# Patient Record
Sex: MALE | Race: WHITE
[De-identification: names, ages, dates, MRNs, and addresses within clinical notes are randomized per-mention and may not be internally consistent; named-entity substitution may affect disease eponyms.]

---

## 2023-07-09 ENCOUNTER — HOSPITAL ENCOUNTER (EMERGENCY)
Dept: HOSPITAL 97 - ER | Age: 38
Discharge: HOME | End: 2023-07-09
Payer: COMMERCIAL

## 2023-07-09 VITALS — OXYGEN SATURATION: 99 % | SYSTOLIC BLOOD PRESSURE: 112 MMHG | DIASTOLIC BLOOD PRESSURE: 78 MMHG

## 2023-07-09 VITALS — TEMPERATURE: 98.4 F

## 2023-07-09 DIAGNOSIS — J32.9: ICD-10-CM

## 2023-07-09 DIAGNOSIS — R05.9: ICD-10-CM

## 2023-07-09 DIAGNOSIS — F17.210: ICD-10-CM

## 2023-07-09 DIAGNOSIS — R19.7: ICD-10-CM

## 2023-07-09 DIAGNOSIS — H66.001: Primary | ICD-10-CM

## 2023-07-09 LAB
ALBUMIN SERPL BCP-MCNC: 3.9 G/DL (ref 3.4–5)
ALP SERPL-CCNC: 83 U/L (ref 45–117)
ALT SERPL W P-5'-P-CCNC: 66 U/L (ref 16–61)
AST SERPL W P-5'-P-CCNC: 38 U/L (ref 15–37)
BUN BLD-MCNC: 20 MG/DL (ref 7–18)
GLUCOSE SERPLBLD-MCNC: 108 MG/DL (ref 74–106)
HCT VFR BLD CALC: 47.1 % (ref 39.6–49)
LIPASE SERPL-CCNC: 32 U/L (ref 13–75)
LYMPHOCYTES # SPEC AUTO: 2.4 K/UL (ref 0.7–4.9)
MCV RBC: 92.5 FL (ref 80–100)
PMV BLD: 6.9 FL (ref 7.6–11.3)
POTASSIUM SERPL-SCNC: 3.4 MEQ/L (ref 3.5–5.1)
RBC # BLD: 5.09 M/UL (ref 4.33–5.43)

## 2023-07-09 PROCEDURE — 80053 COMPREHEN METABOLIC PANEL: CPT

## 2023-07-09 PROCEDURE — 83690 ASSAY OF LIPASE: CPT

## 2023-07-09 PROCEDURE — 74177 CT ABD & PELVIS W/CONTRAST: CPT

## 2023-07-09 PROCEDURE — 99284 EMERGENCY DEPT VISIT MOD MDM: CPT

## 2023-07-09 PROCEDURE — 85025 COMPLETE CBC W/AUTO DIFF WBC: CPT

## 2023-07-09 PROCEDURE — 93005 ELECTROCARDIOGRAM TRACING: CPT

## 2023-07-09 PROCEDURE — 71045 X-RAY EXAM CHEST 1 VIEW: CPT

## 2023-07-09 PROCEDURE — 81001 URINALYSIS AUTO W/SCOPE: CPT

## 2023-07-09 PROCEDURE — 36415 COLL VENOUS BLD VENIPUNCTURE: CPT

## 2023-07-09 PROCEDURE — 84484 ASSAY OF TROPONIN QUANT: CPT

## 2023-07-09 PROCEDURE — 70450 CT HEAD/BRAIN W/O DYE: CPT

## 2023-07-09 NOTE — ER
Nurse's Notes                                                                                     

 [facility]                                                                 

Name: Christiano Farfan                                                                       

Age: 37 yrs                                                                                       

Sex: Male                                                                                         

: 1985                                                                                   

MRN: [id number]                                                                                   

Arrival Date: 2023                                                                          

Time: 18:03                                                                                       

Account#: [id number]                                                                            

Bed 5                                                                                             

Private MD:                                                                                       

Diagnosis: Acute suppurative otitis media-right;Diarrhea, unspecified;Cough;Sinusitis             

                                                                                                  

Presentation:                                                                                     

                                                                                             

18:12 Chief complaint: Spouse and/or significant other states: Hx of Right ear issues; pain   vg1 

      and drainage with odor x 2 days, pt states dizziness and loss of hearing. Also stated       

      N/V/D, wife states "i think its bc [place]      

      [place]". Coronavirus screen: Vaccine status: Patient reports being unvaccinated. Client      

      denies travel out of the [place] in the last 14 days. Ebola Screen: Patient negative for       

      fever greater than or equal to 101.5 degrees Fahrenheit, and additional compatible          

      Ebola Virus Disease symptoms Patient denies exposure to infectious person. Patient          

      denies travel to an Ebola-affected area in the 21 days before illness onset. Initial        

      Sepsis Screen: Does the patient meet any 2 criteria? HR > 90 bpm. Does the patient have     

      a suspected source of infection? No. Patient's initial sepsis screen is negative. Risk      

      Assessment: Do you want to hurt yourself or someone else? Patient reports no desire to      

      harm self or others. Onset of symptoms was 2023.                                   

18:12 Method Of Arrival: Ambulatory                                                           vg1 

18:12 Acuity: PHILIPP 3                                                                           vg1 

                                                                                                  

Triage Assessment:                                                                                

18:15 General: Appears in no apparent distress. uncomfortable, Behavior is cooperative. Pain: vg1 

      Complains of pain in right ear Pain currently is 10 out of 10 on a pain scale. EENT:        

      Reports difficulty swallowing pain in right ear Right ear drainage .                        

                                                                                                  

Historical:                                                                                       

- Allergies:                                                                                      

18:15 No Known Allergies;                                                                     vg1 

- PMHx:                                                                                           

18:15 PTSD; TBI; Nerve Damage; Chronic pain;                                                  vg1 

                                                                                                  

- Immunization history:: Client reports having NOT received the Covid vaccine.                    

- Social history:: Smoking status: Patient reports the use of cigarette tobacco                   

  products, smokes one pack cigarettes per day.                                                   

                                                                                                  

                                                                                                  

Screenin:15 [facility] ED Fall Risk Assessment (Adult) History of falling in the last 3 months,       bp  

      including since admission No falls in past 3 months (0 pts). Abuse screen: Denies           

      threats or abuse. Denies injuries from another. Nutritional screening: No deficits          

      noted. Tuberculosis screening: No symptoms or risk factors identified.                      

                                                                                                  

Assessment:                                                                                       

18:15 General: SEE TRIAGE NOTE.                                                               bp  

19:22 Reassessment: Patient appears in no apparent distress at this time. Patient and/or      bp  

      family updated on plan of care and expected duration. Pain level reassessed. Patient is     

      alert, oriented x 3, equal unlabored respirations, skin warm/dry/pink.                      

20:29 Reassessment: Patient appears in no apparent distress at this time. Patient and/or      bp  

      family updated on plan of care and expected duration. Pain level reassessed. Patient is     

      alert, oriented x 3, equal unlabored respirations, skin warm/dry/pink.                      

21:15 Reassessment: Patient appears in no apparent distress at this time. Patient and/or      bp  

      family updated on plan of care and expected duration. Pain level reassessed. Patient is     

      alert, oriented x 3, equal unlabored respirations, skin warm/dry/pink.                      

                                                                                                  

Vital Signs:                                                                                      

18:12  / 102; Pulse 108; Resp 18; Temp 98.4(O); Pulse Ox 96% on R/A; Height 5 ft. 9 in. vg1 

      ; Pain 10/10;                                                                               

19:22  / 76; Pulse 91; Resp 16; Pulse Ox 98% ;                                          bp  

20:29  / 78; Pulse 89; Resp 16; Pulse Ox 99% ;                                          bp  

18:12 Pain Scale: Adult                                                                       vg1 

                                                                                                  

ED Course:                                                                                        

18:05 Patient arrived in ED.                                                                  ts1 

18:08 Osmar Flores PA is PHCP.                                                                cp  

18:08 Osmar Maguire MD is Attending Physician.                                             cp  

18:15 Triage completed.                                                                       vg1 

18:15 Arm band placed on.                                                                     vg1 

18:15 Patient has correct armband on for positive identification. Bed in low position. Call   bp  

      light in reach. Side rails up X2.                                                           

18:27 Kennedy Landaverde, LARA is Primary Nurse.                                                    bp  

18:41 Inserted saline lock: 20 gauge in right forearm, using aseptic technique. Blood         bp  

      collected.                                                                                  

19:35 CT Head Brain wo Cont In Process Unspecified.                                           EDMS

19:40 CT Abd/Pelvis - IV Contrast Only In Process Unspecified.                                EDMS

20:25 Troponin HS Sent.                                                                       bp  

21:07 XRAY Chest (1 view) In Process Unspecified.                                             EDMS

21:15 No provider procedures requiring assistance completed. IV discontinued, intact,         bp  

      bleeding controlled, No redness/swelling at site. Pressure dressing applied.                

                                                                                                  

Administered Medications:                                                                         

18:41 Drug: NS 0.9% IV 1000 ml Route: IV; Rate: 1 bolus; Site: right forearm;                 bp  

21:15 Drug: Ibuprofen  mg Route: PO;                                                    bp  

21:15 Follow up: Response: Medication administered at discharge.                              bp  

21:15 Drug: HYDROcodone-acetaminophen PO 5 mg-325 mg 1 tabs Route: PO;                        bp  

21:15 Follow up: Response: Medication administered at discharge.                              bp  

                                                                                                  

                                                                                                  

Medication:                                                                                       

18:15 VIS not applicable for this client.                                                     bp  

                                                                                                  

Outcome:                                                                                          

21:05 Discharge ordered by MD.                                                                cp  

21:15 Discharged to home ambulatory, with family.                                             bp  

21:15 Condition: stable                                                                           

21:15 Discharge instructions given to patient, Instructed on discharge instructions, follow       

      up and referral plans. medication usage, Demonstrated understanding of instructions,        

      follow-up care, medications, Prescriptions given X 3.                                       

21:16 Patient left the ED.                                                                    bp  

                                                                                                  

Signatures:                                                                                       

Dispatcher [employer]                           EDMS                                                 

Osmar Flores PA PA cp Peltier, Brian, RN                      RN   bp                                                   

Lacey Erazo, RN                    RN   vg1                                                  

Katina Downing, PAS                     PAS  ts1                                                  

                                                                                                  

**************************************************************************************************

## 2023-07-10 NOTE — EKG
Test Date:    2023-07-09               Test Time:    20:17:55

Technician:   ELEAZAR                                    

                                                     

MEASUREMENT RESULTS:                                       

Intervals:                                           

Rate:         70                                     

MA:           154                                    

QRSD:         100                                    

QT:           406                                    

QTc:          438                                    

Axis:                                                

P:            44                                     

MA:           154                                    

QRS:          4                                      

T:            13                                     

                                                     

INTERPRETIVE STATEMENTS:                                       

                                                     

Normal sinus rhythm

Low voltage QRS

Incomplete right bundle branch block

Nonspecific T wave abnormality

Abnormal ECG

No previous ECG available for comparison



Electronically Signed On 07-10-23 17:10:43 CDT by Marlon Clinton

## 2023-07-13 ENCOUNTER — HOSPITAL ENCOUNTER (EMERGENCY)
Dept: HOSPITAL 97 - ER | Age: 38
LOS: 1 days | Discharge: HOME | End: 2023-07-14
Payer: COMMERCIAL

## 2023-07-13 DIAGNOSIS — Z87.820: ICD-10-CM

## 2023-07-13 DIAGNOSIS — F43.0: Primary | ICD-10-CM

## 2023-07-13 LAB
BUN BLD-MCNC: 21 MG/DL (ref 7–18)
GLUCOSE SERPLBLD-MCNC: 102 MG/DL (ref 74–106)
HCT VFR BLD CALC: 46.3 % (ref 39.6–49)
LYMPHOCYTES # SPEC AUTO: 2.6 K/UL (ref 0.7–4.9)
MCV RBC: 91.8 FL (ref 80–100)
METHAMPHET UR QL SCN: POSITIVE
PMV BLD: 7.1 FL (ref 7.6–11.3)
POTASSIUM SERPL-SCNC: 3.6 MEQ/L (ref 3.5–5.1)
RBC # BLD: 5.04 M/UL (ref 4.33–5.43)
THC SERPL-MCNC: POSITIVE NG/ML
TROPONIN I SERPL HS-MCNC: 4.1 PG/ML (ref ?–58.9)

## 2023-07-13 PROCEDURE — 96374 THER/PROPH/DIAG INJ IV PUSH: CPT

## 2023-07-13 PROCEDURE — 85025 COMPLETE CBC W/AUTO DIFF WBC: CPT

## 2023-07-13 PROCEDURE — 71045 X-RAY EXAM CHEST 1 VIEW: CPT

## 2023-07-13 PROCEDURE — 84484 ASSAY OF TROPONIN QUANT: CPT

## 2023-07-13 PROCEDURE — 96375 TX/PRO/DX INJ NEW DRUG ADDON: CPT

## 2023-07-13 PROCEDURE — 70450 CT HEAD/BRAIN W/O DYE: CPT

## 2023-07-13 PROCEDURE — 70498 CT ANGIOGRAPHY NECK: CPT

## 2023-07-13 PROCEDURE — 70496 CT ANGIOGRAPHY HEAD: CPT

## 2023-07-13 PROCEDURE — 99285 EMERGENCY DEPT VISIT HI MDM: CPT

## 2023-07-13 PROCEDURE — 72125 CT NECK SPINE W/O DYE: CPT

## 2023-07-13 PROCEDURE — 82077 ASSAY SPEC XCP UR&BREATH IA: CPT

## 2023-07-13 PROCEDURE — 93005 ELECTROCARDIOGRAM TRACING: CPT

## 2023-07-13 PROCEDURE — 80048 BASIC METABOLIC PNL TOTAL CA: CPT

## 2023-07-13 PROCEDURE — 36415 COLL VENOUS BLD VENIPUNCTURE: CPT

## 2023-07-13 PROCEDURE — 80307 DRUG TEST PRSMV CHEM ANLYZR: CPT

## 2023-07-14 VITALS — TEMPERATURE: 99 F

## 2023-07-14 VITALS — SYSTOLIC BLOOD PRESSURE: 118 MMHG | DIASTOLIC BLOOD PRESSURE: 65 MMHG

## 2023-07-14 VITALS — OXYGEN SATURATION: 97 %

## 2023-07-14 NOTE — RAD REPORT
EXAM DESCRIPTION:  CT - Head C Spine Mpr Wo Con - 7/14/2023 6:29 am

******** ADDENDUM #1 ********

THIS REPORT CONTAINS FINDINGS THAT MAY BE CRITICAL TO PATIENT CARE: Notification that the physician w
as unable to speak on the phone occurred at 7/14/2023 12:02 AM CDT. I discussed the findings with RN 
KTAIE MCGOWAN who agreed to take the results on the phone on behalf of the physician, and acknowledges t
heir critical nature.

Electronically signed by:   Makeda Valerio MD   7/14/2023 12:05 AM CDT Workstation: [id number]

*** End of Addendum ***

 

EXAM DESCRIPTION:  CT Head and Cervical Spine Without Intravenous Contrast

 

CLINICAL HISTORY:  The patient is 37 years old and is Male; LEFT ARM NUMBNESS

 

TECHNIQUE:  Axial computed tomography images of the head/brain and cervical spine without intravenous
 contrast.   Sagittal and coronal reformatted images were created and reviewed.   This CT exam was pe
rformed using one or more of the following dose reduction techniques:   automated exposure control, a
djustment of the mA and/or kV according to patient size, and/or use of iterative reconstruction techn
ique.

 

COMPARISON:  CT of the head July 9, 2023

 

FINDINGS:  BRAIN:   Unremarkable.   No hemorrhage.   No significant white matter disease.   No edema.


  VENTRICLES:   Unremarkable.   No ventriculomegaly.

  SKULL:   No acute fracture.

  SINUSES:   Mucoperiosteal thickening of right maxillary sinus is present.

  MASTOID AIR CELLS:   Fluid is present within the right mastoid air cells. Left mastoid air cells ar
e clear.

  VERTEBRAE:   The vertebral body heights and alignment are maintained.   No acute fracture.

  DISCS/SPINAL CANAL/NEURAL FORAMINA:   Intervertebral disc space narrowing with anterior osteophyte 
formation of C3-C7 is present. There is no significant canal stenosis or neural foraminal narrowing.

  SOFT TISSUES:   The soft tissues are normal.

  VASCULATURE:   Questionable minimal hyperdensity within the right middle cerebral artery is noted.

  LUNG APICES:   Unremarkable as visualized.

  OTHER FINDINGS:   The patient is slightly tilted in the scanner.

 

IMPRESSION:  1.   Questionable hyperdensity within the right middle cerebral artery which could be se
condary to an acute thrombus. Recommend further evaluation with a CTA of the head.

2.   No acute intracranial hemorrhage.

3.   Minimal spondylosis of the cervical spine without acute findings.

 

Electronically signed by:   Makeda Valerio MD   7/13/2023 11:54 PM CDT Workstation: [id number]

******** ADDENDUM #1 ********

THIS REPORT CONTAINS FINDINGS THAT MAY BE CRITICAL TO PATIENT CARE: Notification that the physician w
as unable to speak on the phone occurred at 7/14/2023 12:02 AM CDT. I discussed the findings with LARA MCGOWAN who agreed to take the results on the phone on behalf of the physician, and acknowledges t
heir critical nature.

Electronically signed by:   Makeda Valerio MD   7/14/2023 12:05 AM CDT Workstation: [id number]

*** End of Addendum ***

******** ADDENDUM #1 ********

THIS REPORT CONTAINS FINDINGS THAT MAY BE CRITICAL TO PATIENT CARE: Notification that the physician w
as unable to speak on the phone occurred at 7/14/2023 12:02 AM CDT. I discussed the findings with LARA MCGOWAN who agreed to take the results on the phone on behalf of the physician, and acknowledges t
heir critical nature.

Electronically signed by:   Makeda Valerio MD   7/14/2023 12:05 AM CDT Workstation: [id number]

*** End of Addendum ***

 

EXAM DESCRIPTION:  CT Head and Cervical Spine Without Intravenous Contrast

 

CLINICAL HISTORY:  The patient is 37 years old and is Male; LEFT ARM NUMBNESS

 

TECHNIQUE:  Axial computed tomography images of the head/brain and cervical spine without intravenous
 contrast.   Sagittal and coronal reformatted images were created and reviewed.   This CT exam was pe
rformed using one or more of the following dose reduction techniques:   automated exposure control, a
djustment of the mA and/or kV according to patient size, and/or use of iterative reconstruction techn
ique.

 

COMPARISON:  CT of the head July 9, 2023

 

FINDINGS:  BRAIN:   Unremarkable.   No hemorrhage.   No significant white matter disease.   No edema.


  VENTRICLES:   Unremarkable.   No ventriculomegaly.

  SKULL:   No acute fracture.

  SINUSES:   Mucoperiosteal thickening of right maxillary sinus is present.

  MASTOID AIR CELLS:   Fluid is present within the right mastoid air cells. Left mastoid air cells ar
e clear.

  VERTEBRAE:   The vertebral body heights and alignment are maintained.   No acute fracture.

  DISCS/SPINAL CANAL/NEURAL FORAMINA:   Intervertebral disc space narrowing with anterior osteophyte 
formation of C3-C7 is present. There is no significant canal stenosis or neural foraminal narrowing.

  SOFT TISSUES:   The soft tissues are normal.

  VASCULATURE:   Questionable minimal hyperdensity within the right middle cerebral artery is noted.

  LUNG APICES:   Unremarkable as visualized.

  OTHER FINDINGS:   The patient is slightly tilted in the scanner.

 

IMPRESSION:  1.   Questionable hyperdensity within the right middle cerebral artery which could be se
condary to an acute thrombus. Recommend further evaluation with a CTA of the head.

2.   No acute intracranial hemorrhage.

3.   Minimal spondylosis of the cervical spine without acute findings.

 

Electronically signed by:   Makeda Valerio MD   7/13/2023 11:54 PM CDT Workstation: [id number]

 

 

 

 

Due to temporary technical issues with the PACS/Fluency reporting system, reports are being signed by
 the in house radiologists without review as a courtesy to insure prompt reporting. The interpreting 
radiologist is fully responsible for the content of the report.

## 2023-07-14 NOTE — RAD REPORT
EXAM DESCRIPTION:  CT - Neck Angio - 7/14/2023 6:32 am

 

CLINICAL HISTORY:  37 years, Male, PAIN

 

COMPARISON:  None.

 

TECHNIQUE:  Axial CTA images of the head and neck obtained following the uncomplicated intravenous ad
ministration of iodinated contrast. 3-D/MIP reformatted images available. This exam was performed acc
ording to our departmental dose-optimization program, which includes automated exposure control, adju
stment of the mA and/or kV according to patient size and/or use of iterative reconstruction technique
.

 

FINDINGS:  CTA head:

In the anterior circulation, the intracranial internal carotid arteries have normal course. The inter
nal carotid arteries bifurcate into patent A1 and M1 segments of the anterior and middle cerebral art
eries respectively. No evidence of flow-limiting stenosis, aneurysm, occlusion, or dissection in the 
anterior circulation. The anterior communicating artery is patent.

In the posterior circulation, the intracranial vertebral arteries combine to form a patent basilar ar
chucho. Dominant right vertebral artery. The basilar artery bifurcates into patent P1 segments of the p
osterior cerebral artery. No evidence of stenosis, aneurysm, occlusion, or dissection in the posterio
r circulation.

No definite acute intracranial abnormality identified. No acute abnormality of the osseous calvarium.
 Paranasal sinuses and mastoid air cells are well aerated.

CTA NECK:

The aortic arch has normal anatomic configuration. The origin of the great vessels are patent.

The right common carotid artery is patent and bifurcates into patent internal and external carotid ar
teries. 0% stenosis by NASCET criteria. No evidence of occlusion or dissection.

The left common carotid artery is patent and bifurcates into patent internal and external carotid art
eries. 0% stenosis by NASCET criteria. No evidence of occlusion or dissection.

The cervical vertebral arteries are patent throughout their course. Dominant right vertebral artery. 
No evidence of occlusion, stenosis, or dissection.

No definite acute abnormalities in the neck soft tissues. No apical pneumothorax. No acute osseous ab
normalities. Mild endplate spondylosis of the cervical spine.

 

IMPRESSION:  1.   No evidence of stenosis, occlusion, or aneurysm in the intracranial arterial circul
ation.

2.   No evidence of stenosis/occlusion involving the cervical carotid or vertebral arteries.

 

Electronically signed by:   Christiano Quintero   7/14/2023 2:21 AM CDT Workstation: [id number]

 

 

 

 

Due to temporary technical issues with the PACS/Fluency reporting system, reports are being signed by
 the in house radiologists without review as a courtesy to insure prompt reporting. The interpreting 
radiologist is fully responsible for the content of the report.

## 2023-07-14 NOTE — RAD REPORT
EXAM DESCRIPTION:  CT - Head angio - 7/14/2023 6:32 am

 

 

 

CLINICAL HISTORY:  37 years, Male, PAIN

 

COMPARISON:  None.

 

TECHNIQUE:  Axial CTA images of the head and neck obtained following the uncomplicated intravenous ad
ministration of iodinated contrast. 3-D/MIP reformatted images available. This exam was performed acc
ording to our departmental dose-optimization program, which includes automated exposure control, adju
stment of the mA and/or kV according to patient size and/or use of iterative reconstruction technique
.

 

FINDINGS:  CTA head:

In the anterior circulation, the intracranial internal carotid arteries have normal course. The inter
nal carotid arteries bifurcate into patent A1 and M1 segments of the anterior and middle cerebral art
eries respectively. No evidence of flow-limiting stenosis, aneurysm, occlusion, or dissection in the 
anterior circulation. The anterior communicating artery is patent.

In the posterior circulation, the intracranial vertebral arteries combine to form a patent basilar ar
chucho. Dominant right vertebral artery. The basilar artery bifurcates into patent P1 segments of the p
osterior cerebral artery. No evidence of stenosis, aneurysm, occlusion, or dissection in the posterio
r circulation.

No definite acute intracranial abnormality identified. No acute abnormality of the osseous calvarium.
 Paranasal sinuses and mastoid air cells are well aerated.

CTA NECK:

The aortic arch has normal anatomic configuration. The origin of the great vessels are patent.

The right common carotid artery is patent and bifurcates into patent internal and external carotid ar
teries. 0% stenosis by NASCET criteria. No evidence of occlusion or dissection.

The left common carotid artery is patent and bifurcates into patent internal and external carotid art
eries. 0% stenosis by NASCET criteria. No evidence of occlusion or dissection.

The cervical vertebral arteries are patent throughout their course. Dominant right vertebral artery. 
No evidence of occlusion, stenosis, or dissection.

No definite acute abnormalities in the neck soft tissues. No apical pneumothorax. No acute osseous ab
normalities. Mild endplate spondylosis of the cervical spine.

 

IMPRESSION:  1.   No evidence of stenosis, occlusion, or aneurysm in the intracranial arterial circul
ation.

2.   No evidence of stenosis/occlusion involving the cervical carotid or vertebral arteries.

 

Electronically signed by:   Christiano Quintero   7/14/2023 2:21 AM CDT Workstation: [id number]

 

 

 

Due to temporary technical issues with the PACS/Fluency reporting system, reports are being signed by
 the in house radiologists without review as a courtesy to insure prompt reporting. The interpreting 
radiologist is fully responsible for the content of the report.
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
## 2023-07-14 NOTE — EDPHYS
Physician Documentation                                                                           

 [facility]                                                                 

Name: Christiano Farfan                                                                       

Age: 37 yrs                                                                                       

Sex: Male                                                                                         

: 1985                                                                                   

MRN: [id number]                                                                                   

Arrival Date: 2023                                                                          

Time: 22:13                                                                                       

Account#: [id number]                                                                            

Bed 15                                                                                            

Private MD:                                                                                       

ED Physician Cabrera Osman                                                                         

HPI:                                                                                              

                                                                                             

23:35 This 37 yrs old Male presents to ER via EMS with complaints of chest pain, palpitations.rn  

23:35 The patient or guardian reports chest pain that is located primarily in the chest       rn  

      diffusely. The pain radiates to the left arm. Associated signs and symptoms: Pertinent      

      positives: None. Pertinent negatives: abdominal pain, cough, shortness of breath,           

      syncope, vomiting. The chest pain is described as stabbing. Duration: The patient or        

      guardian reports multiple episodes, that are intermittent. Modifying factors: The           

      symptoms are alleviated by nothing. the symptoms are aggravated by emotionally              

      stressful situations. Severity of pain: At its worst the pain was moderate in the           

      emergency department the pain is unchanged. The patient has experienced similar             

      episodes in the past. The patient has not recently seen a physician. Pt reports             

      arrested, taken to half-way, was having argument with wife/significant other. Pt with           

      baseline left arm and leg tingling/weakness from previous injury and back problems.         

      Reports increased burning to left arm with the chest pain that concerned him. Denies        

      drug use. Had some ETOH today prior to being arrested. .                                    

                                                                                                  

Historical:                                                                                       

- Allergies:                                                                                      

22:23 No Known Allergies;                                                                     jb4 

- Home Meds:                                                                                      

22:23 Trazadone [Active]; gabapentin oral [Active];                                           jb4 

- PMHx:                                                                                           

22:23 Chronic pain; PTSD; Nerve damage; TBI;                                                  jb4 

- PSHx:                                                                                           

22:23 None;                                                                                   jb4 

                                                                                                  

- Immunization history:: Adult Immunizations up to date.                                          

- Social history:: Smoking status: Patient denies any tobacco usage or history of.                

- Family history:: not pertinent.                                                                 

- Hospitalizations: : No recent hospitalization is reported.                                      

                                                                                                  

                                                                                                  

ROS:                                                                                              

23:35 Constitutional: Negative for fever, chills, and weight loss, Eyes: Negative for injury, rn  

      pain, redness, and discharge, Neck: Negative for injury, pain, and swelling,                

      Cardiovascular: + chest pain Respiratory: Negative for shortness of breath, cough,          

      wheezing, and pleuritic chest pain, Abdomen/GI: Negative for abdominal pain, nausea,        

      vomiting, diarrhea, and constipation, Back: Negative for injury and pain, MS/Extremity:     

      Negative for injury and deformity, Skin: Negative for injury, rash, and discoloration,      

      Neuro: Negative for headache, and seizure                                                   

                                                                                                  

Exam:                                                                                             

23:35 Constitutional:  This is a well developed, well nourished patient who is awake, alert,  rn  

      agitated but answers all questions.  Head/Face:  Normocephalic, atraumatic.                 

      Chest/axilla:  Normal chest wall appearance and motion.  Nontender with no deformity.       

      No lesions are appreciated. Cardiovascular:  Regular rate and rhythm.  No pulse             

      deficits. Respiratory:  No increased work of breathing, no retractions or nasal flaring     

      Abdomen/GI:  Soft, non-tender Skin:  Warm, dry MS/ Extremity:  Pulses equal, no             

      cyanosis. Neuro:  Awake and alert, GCS 15, oriented to person, place, time, and             

      situation.  Cranial nerves II-XII grossly intact.  Motor strength 5/5 RUE/RLE, 4/5          

      strength LUE and 4+/5 strength LLE. Some decreased sensation to left arm.                   

23:43 ECG was reviewed by the Attending Physician.                                            rn  

                                                                                                  

Vital Signs:                                                                                      

22:19  / 78; Pulse 79; Resp 23; Temp 99(O); Pulse Ox 100% on R/A; Weight 99.79 kg (R);  jb4 

      Height 5 ft. 9 in. ;                                                                        

23:31  / 70; Pulse 77; Resp 21; Pulse Ox 97% on R/A;                                    4 

                                                                                             

00:39  / 90; Pulse 75; Resp 28; Pulse Ox 98% on R/A;                                    4 

01:30  / 71; Pulse 88; Resp 16; Pulse Ox 97% on R/A;                                    jb4 

02:45  / 65; Pulse 84; Resp 16; Pulse Ox 97% on R/A;                                    jb4 

                                                                                             

22:19 Body Mass Index 32.49 (99.79 kg, 175.26 cm)                                             [facility] 

                                                                                                  

NIH Stroke Scale Scores:                                                                          

01:27 NIHSS Score: 1                                                                          [facility] 

                                                                                                  

MDM:                                                                                              

                                                                                             

22:15 Patient medically screened.                                                             rn  

                                                                                             

00:17 ED course: Pt improved after ativan, now less agitated. CT head per radiology shows     rn  

      possible abnormality in CT head, requests CTA, CTA head and neck ordered. Patient now       

      out of cuffs and reports last known normal "around 5PM". States during argument started     

      to feel burning sensation and then some weakness to left arm and some difficulty            

      walking at the time, was arrested at officer on scene at approx 5:15pm, verified with       

      other officer here, taken to half-way, then brought here later. More pronounced weakness on     

      exam now. Pt reports has had "stress induced stroke and heart attack in past". Given        

      new details of presentation and timing, abnormality of CT head could indicate CVA, but      

      even on initial presentation to ER, pt out of TNKase window, as presented > 5 hours         

      post last known normal..                                                                    

00:22 ED course: If CTA shows LVO, will have to transfer for possible intravascular           rn  

      procedure. .                                                                                

00:24 ED course: Nurse noted results from radiology at 0002 for me as I was in another room   rn  

      performing a procedure. Asked them to place order for CTA. .                                

00:52 ED course: CT still has not performed CTA, called them and they will perform it soon..  rn  

01:38 ED course: Currently NIH 1, still mild weakness LUE  strength but no drift, and +   rn  

      numbness LUE. .                                                                             

02:02 ED course: Still no read or updates from radiology. Pt using arm and leg to reposition  rn  

      himself in bed, does not appear as weak as before. .                                        

02:39 Differential diagnosis: acute myocardial infarction, acute pericarditis, anxiety,       rn  

      costochondritis, pleurisy, pneumonia, pneumothorax, CVA, acute stress reaction, drug        

      adverse effect. Data reviewed: vital signs, nurses notes, lab test result(s), EKG,          

      radiologic studies, CT scan, and as a result, I will discharge patient. Counseling: I       

      had a detailed discussion with the patient and/or guardian regarding: the historical        

      points, exam findings, and any diagnostic results supporting the discharge/admit            

      diagnosis, lab results, radiology results, the need for outpatient follow up, to return     

      to the emergency department if symptoms worsen or persist or if there are any questions     

      or concerns that arise at home. Response to treatment: the patient's symptoms have          

      markedly improved after treatment, the patient's condition has returned to base line,       

      and as a result, I will discharge patient. Special discussion: I discussed with the         

      patient/guardian in detail that at this point there is no indication for admission to       

      the hospital. It is understood, however, that if the symptoms persist or worsen the         

      patient needs to return immediately for re-evaluation. ED course: Pt back to baseline.      

      CTA head/neck without acute findings, was not true finding on CT head. Pt states his        

      left arm tingling and mild weakness is at his baseline. States this has happened            

      multiple times in past, usually after or during argument with wife. .                       

                                                                                                  

                                                                                             

22:16 Order name: Basic Metabolic Panel; Complete Time: 23:42                                 rn  

                                                                                             

22:16 Order name: CBC with Diff; Complete Time: 23:42                                         rn  

                                                                                             

22:16 Order name: Troponin HS; Complete Time: 23:42                                           rn  

                                                                                             

22:16 Order name: ETOH Level; Complete Time: 23:42                                            rn  

                                                                                             

22:16 Order name: Urine Drug Screen; Complete Time: 00:33                                     rn  

                                                                                             

22:16 Order name: XRAY Chest (1 view)                                                         rn  

                                                                                             

22:49 Order name: CT Head C Spine                                                             rn  

                                                                                             

00:04 Order name: CT Head Angio                                                               sb4 

                                                                                             

00:04 Order name: CT Neck Angio                                                               4 

                                                                                             

22:16 Order name: EKG; Complete Time: 22:16                                                   rn  

                                                                                             

22:16 Order name: Cardiac monitoring; Complete Time: 22:43                                    rn  

                                                                                             

22:16 Order name: EKG - Nurse/Tech; Complete Time: 22:43                                      rn  

                                                                                             

22:16 Order name: IV Saline Lock; Complete Time: 22:43                                        rn  

                                                                                             

22:16 Order name: Labs collected and sent; Complete Time: 22:43                               rn  

                                                                                             

22:16 Order name: O2 Per Protocol; Complete Time: 22:43                                       rn  

                                                                                             

22:16 Order name: O2 Sat Monitoring; Complete Time: 22:43                                     rn  

                                                                                                  

EC/13                                                                                             

23:43 Rate is 76 beats/min. Rhythm is regular. QRS Axis is Normal. ND interval is normal. QRS rn  

      interval is normal. QT interval is normal. No Q waves. T waves are Normal. No ST            

      changes noted. Clinical impression: Normal ECG. Interpreted by me. Reviewed by me.          

                                                                                                  

Administered Medications:                                                                         

22:42 Drug: Pantoprazole IVP 40 mg Route: IVP; Site: right antecubital;                       jb4 

22:43 Drug: Ativan IVP 0.5 mg Route: IVP; Site: right antecubital;                            jb4 

                                                                                             

01:26 Drug: Aspirin PO Chewable Tablet 324 mg Route: PO;                                      jb4 

01:26 Drug: foLIC Acid IVPB 1 mg Route: IVPB; Site: right antecubital;                        jb4 

                                                                                                  

                                                                                                  

Disposition Summary:                                                                              

23 02:41                                                                                    

Discharge Ordered                                                                                 

      Location: Home                                                                          rn  

      Problem: new                                                                            rn  

      Symptoms: have improved                                                                 rn  

      Condition: Stable                                                                       rn  

      Diagnosis                                                                                   

        - Acute stress reaction                                                               rn  

        - Chest pain, unspecified                                                             rn  

      Followup:                                                                               rn  

        - With: Private Physician                                                                  

        - When: As needed                                                                          

        - Reason: Recheck today's complaints, Re-evaluation by your physician                      

      Discharge Instructions:                                                                     

        - Discharge Summary Sheet                                                             rn  

        - Nonspecific Chest Pain, Adult                                                       rn  

        - Stress, Adult                                                                       rn  

      Forms:                                                                                      

        - Medication Reconciliation Form                                                      rn  

        - Thank You Letter                                                                    rn  

        - Antibiotic Education                                                                rn  

        - Prescription Opioid Use                                                             rn  

        - Patient Portal Instructions                                                         rn  

                                                                                                  

NIH Stroke Scale - NIH Stroke Score                                                               

Date: 2023                                                                                  

Time: :                                                                                       

Total Score = 1                                                                                   

  10. Dysarthria (speech clarity - read or repeat words) - 0(Normal)                              

  11. Extinction and Inattention (visual/tactile/auditory/spatial/personal) - 0(No                

      abnormality)                                                                                

  1a. Level of Consciousness (LOC) - 0(Alert)                                                     

  1b. Level of Consciousness (LOC) (Month \T\ Age) - 0(Both)                                      

  1c. LOC Commands (Open \T\ Closes Eyes/) - 0(Both)                                          

   2. Best Gaze (Lateral Gaze Paresis) - 0(Normal)                                                

   3. Visual Field Loss - 0(No visual loss)                                                       

   4. Facial Palsy - 0(Normal)                                                                    

  5a. Left Arm: Motor (10-second hold) - 0(No drift)                                              

  5b. Right Arm: Motor (10-second hold) - 0(No drift)                                             

  6a. Left Leg: Motor (5-second hold - always test supine) - 0(No drift)                          

  6b. Right Leg: Motor (5-second hold - always test supine) - 0(No drift)                         

   7. Limb Ataxia (finger/nose \T\ heel/shin - test with eyes open) - 0(Absent)                   

   8. Sensory Loss (pinprick arms/legs/face) - 1(Mild to moderate loss)                           

   9. Best Language: Aphasia (description/naming/reading) - 0(No aphasia)                         

Initials: jb4                                                                                     

                                                                                                  

Signatures:                                                                                       

Dispatcher [employer]                           Cabrera Galvan MD MD rn Bryson, James, RN RN jb4                                                  

                                                                                                  

Corrections: (The following items were deleted from the chart)                                    

                                                                                             

23:41 23:35 Constitutional: This is a well developed, well nourished patient who is   rn          

      awake, alert, agitated but answers all questions. Head/Face: Normocephalic,                 

      atraumatic. Cardiovascular: Regular rate and rhythm. No pulse deficits.                     

      Respiratory: No increased work of breathing, no retractions or nasal flaring                

      Abdomen/GI: Soft, non-tender Skin: Warm, dry MS/ Extremity: Pulses equal, no                

      cyanosis. Neuro: Awake and alert, GCS 15, oriented to person, place, time, and              

      situation. Cranial nerves II-XII grossly intact. Motor strength 5/5 in all                  

      extremities. Some decreased sensation to left arm. rn                                       

                                                                                             

00:13  23:35 Constitutional: This is a well developed, well nourished patient    rn          

      who is awake, alert, agitated but answers all questions. Head/Face:                         

      Normocephalic, atraumatic. Chest/axilla: Normal chest wall appearance and                   

      motion. Nontender with no deformity. No lesions are appreciated.                            

      Cardiovascular: Regular rate and rhythm. No pulse deficits. Respiratory: No                 

      increased work of breathing, no retractions or nasal flaring Abdomen/GI: Soft,              

      non-tender Skin: Warm, dry MS/ Extremity: Pulses equal, no cyanosis. Neuro:                 

      Awake and alert, GCS 15, oriented to person, place, time, and situation.                    

      Cranial nerves II-XII grossly intact. Motor strength 5/5 in all extremities.                

      Some decreased sensation to left arm. rn                                                    

                                                                                                  

**************************************************************************************************

## 2023-07-14 NOTE — RAD REPORT
EXAM DESCRIPTION:  RAD - Chest Single View - 7/13/2023 10:53 pm

 

 

CLINICAL HISTORY:  The patient is 37 years old and is Male; CHEST PAIN

 

TECHNIQUE:  Frontal view of the chest.

 

COMPARISON:  No relevant prior studies available.

 

FINDINGS:  Lungs:   Unremarkable.   No consolidation.

  Pleural space:   Unremarkable.   No pneumothorax.

  Heart:   Unremarkable.

  Mediastinum:   Unremarkable.

  Bones/joints:   Unremarkable.

 

IMPRESSION:  No acute findings in the chest.

 

Electronically signed by:   Saulo Del Cid MD   7/13/2023 11:42 PM CDT Workstation: [id number]

 

 

 

 

Due to temporary technical issues with the PACS/Fluency reporting system, reports are being signed by
 the in house radiologists without review as a courtesy to insure prompt reporting. The interpreting 
radiologist is fully responsible for the content of the report.

## 2023-07-17 NOTE — EKG
Test Date:    2023-07-13               Test Time:    22:31:34

Technician:   MARIA                                    

                                                     

MEASUREMENT RESULTS:                                       

Intervals:                                           

Rate:         76                                     

CT:           140                                    

QRSD:         96                                     

QT:           396                                    

QTc:          445                                    

Axis:                                                

P:            29                                     

CT:           140                                    

QRS:          21                                     

T:            57                                     

                                                     

INTERPRETIVE STATEMENTS:                                       

                                                     

Normal sinus rhythm

Normal ECG

Compared to ECG 07/09/2023 20:17:55

Incomplete right bundle-branch block no longer present

T-wave abnormality no longer present



Electronically Signed On 07-17-23 11:47:50 CDT by Marlon Clinton

## 2024-09-06 NOTE — RAD REPORT
EXAM DESCRIPTION:  CT - Abdomen   Pelvis W Contrast - 7/9/2023 7:38 pm

 

CLINICAL HISTORY:  ABD PAIN

 

COMPARISON:  No comparisons

 

TECHNIQUE:  Thin cut axial CT imaging of the abdomen and pelvis was performed following intravenous a
dministration of 95 mL Isovue 300. Multiplanar reformats were generated and reviewed.

 

All CT scans are performed using dose optimization technique as appropriate and may include automated
 exposure control or mA/KV adjustment according to patient size.

 

FINDINGS:  No suspicious findings in the lung bases.

 

The liver, spleen, and pancreas show no suspicious findings. Gallbladder and biliary tree are also wi
thout suspicious finding.

 

Symmetric renal function is seen with no hydronephrosis or suspicious renal mass.

 

No dilated bowel loops or bowel wall thickening. No free air, free fluid or inflammatory stranding. N
o hernia, mass or bulky lymphadenopathy. The urinary bladder is without significant finding.

 

No suspicious bony findings.

 

 

IMPRESSION:  No acute intra-abdominal process.
EXAM DESCRIPTION:  CT - Head Brain Wo Cont - 7/9/2023 7:33 pm

 

CLINICAL HISTORY:  HEADACHE

 

COMPARISON:  No comparisons

 

TECHNIQUE:  Noncontrast head CT images were obtained without IV contrast. Multiplanar reformats were 
generated and reviewed.

 

All CT scans are performed using dose optimization technique as appropriate and may include automated
 exposure control or mA/KV adjustment according to patient size.

 

FINDINGS:  No intracranial hemorrhage, mass, or edema. Midline structures are unremarkable.

 

Normal ventricular caliber for age.

 

Gray-white matter differentiation is preserved, without evidence of acute infarct. No abnormal extra-
axial fluid collections.

 

Right maxillary and right mastoid air cell opacification.

 

No acute bony findings.

 

 

IMPRESSION:  No evidence of an acute intracranial process.

 

Right maxillary and right mastoid air cell opacification. Please correlate clinically for evidence of
 acute sinusitis.
EXAM DESCRIPTION:  Catie Single View7/9/2023 9:05 pm

 

CLINICAL HISTORY:  Chest pain;Cough

 

COMPARISON:  No comparisons

 

TECHNIQUE:   Portable AP view of the chest.

 

FINDINGS:  The lungs are clear.  No pneumothorax or effusion. The cardiomediastinal contours are unre
markable.

 

IMPRESSION:  No acute cardiopulmonary process.
no